# Patient Record
Sex: MALE | Race: OTHER | NOT HISPANIC OR LATINO
[De-identification: names, ages, dates, MRNs, and addresses within clinical notes are randomized per-mention and may not be internally consistent; named-entity substitution may affect disease eponyms.]

---

## 2022-10-02 PROBLEM — Z00.00 ENCOUNTER FOR PREVENTIVE HEALTH EXAMINATION: Status: ACTIVE | Noted: 2022-10-02

## 2022-10-03 ENCOUNTER — APPOINTMENT (OUTPATIENT)
Dept: CARDIOLOGY | Facility: CLINIC | Age: 47
End: 2022-10-03

## 2022-10-03 VITALS
SYSTOLIC BLOOD PRESSURE: 142 MMHG | HEIGHT: 66.14 IN | OXYGEN SATURATION: 95 % | TEMPERATURE: 96.8 F | WEIGHT: 145 LBS | HEART RATE: 76 BPM | DIASTOLIC BLOOD PRESSURE: 93 MMHG | BODY MASS INDEX: 23.3 KG/M2 | RESPIRATION RATE: 16 BRPM

## 2022-10-03 DIAGNOSIS — Z82.49 FAMILY HISTORY OF ISCHEMIC HEART DISEASE AND OTHER DISEASES OF THE CIRCULATORY SYSTEM: ICD-10-CM

## 2022-10-03 DIAGNOSIS — B18.1 CHRONIC VIRAL HEPATITIS B W/OUT DELTA-AGENT: ICD-10-CM

## 2022-10-03 DIAGNOSIS — R07.89 OTHER CHEST PAIN: ICD-10-CM

## 2022-10-03 PROCEDURE — 99204 OFFICE O/P NEW MOD 45 MIN: CPT | Mod: 25

## 2022-10-03 PROCEDURE — 93306 TTE W/DOPPLER COMPLETE: CPT

## 2022-10-03 PROCEDURE — 93015 CV STRESS TEST SUPVJ I&R: CPT

## 2022-10-22 NOTE — HISTORY OF PRESENT ILLNESS
[FreeTextEntry1] : 47 year-old male with Hep B presents for evaluation of CP.  Patient reports that for the past 3 months he has been experiencing epigastric and lower chest discomfort, not related to exertion, when he overeats, lasting 2 hours.  Patient denies SOB.  Patient denies palpitations.  Patient denies h/o syncope.  Patient reports family history of CAD (mother with stent).  I advised patient to undergo an echocardiogram and a treadmill stress test.

## 2024-06-12 ENCOUNTER — OFFICE (OUTPATIENT)
Dept: URBAN - METROPOLITAN AREA CLINIC 122 | Facility: CLINIC | Age: 49
Setting detail: OPHTHALMOLOGY
End: 2024-06-12
Payer: COMMERCIAL

## 2024-06-12 DIAGNOSIS — H11.153: ICD-10-CM

## 2024-06-12 DIAGNOSIS — D31.00: ICD-10-CM

## 2024-06-12 DIAGNOSIS — H16.223: ICD-10-CM

## 2024-06-12 PROCEDURE — 92014 COMPRE OPH EXAM EST PT 1/>: CPT | Performed by: OPHTHALMOLOGY
